# Patient Record
Sex: FEMALE
[De-identification: names, ages, dates, MRNs, and addresses within clinical notes are randomized per-mention and may not be internally consistent; named-entity substitution may affect disease eponyms.]

---

## 2021-07-08 ENCOUNTER — APPOINTMENT (OUTPATIENT)
Dept: DERMATOLOGY | Facility: CLINIC | Age: 36
End: 2021-07-08

## 2022-07-25 PROBLEM — Z00.00 ENCOUNTER FOR PREVENTIVE HEALTH EXAMINATION: Status: ACTIVE | Noted: 2022-07-25

## 2022-07-28 ENCOUNTER — APPOINTMENT (OUTPATIENT)
Dept: ORTHOPEDIC SURGERY | Facility: CLINIC | Age: 37
End: 2022-07-28

## 2022-07-28 DIAGNOSIS — M25.852 OTHER SPECIFIED JOINT DISORDERS, LEFT HIP: ICD-10-CM

## 2022-07-28 PROCEDURE — 73503 X-RAY EXAM HIP UNI 4/> VIEWS: CPT

## 2022-07-28 PROCEDURE — 99204 OFFICE O/P NEW MOD 45 MIN: CPT

## 2022-07-28 RX ORDER — MELOXICAM 15 MG/1
15 TABLET ORAL
Qty: 30 | Refills: 2 | Status: ACTIVE | COMMUNITY
Start: 2022-07-28 | End: 1900-01-01

## 2022-07-29 NOTE — ADDENDUM
[FreeTextEntry1] : Documented by Sulema Novak acting as a scribe for Dr. Jj and Rober Kemp PA-C on 07/28/2022. \par \par All medical record entries made by the Scribe were at my, Dr. Jj, and Rober Kemp's, direction and\par personally dictated by me on 07/28/2022. I have reviewed the chart and agree that the record\par accurately reflects my personal performance of the history, physical exam, procedure and imaging.

## 2022-07-29 NOTE — DISCUSSION/SUMMARY
[de-identified] : MARTINE is a 36 year female here today for left  hip pain that has worsened over the past 6 months. She reports clicking, popping and occasional buckling. She denies numbness and tingling.\par \par We had a thorough discussion regarding the nature of her pain, the pathophysiology, as well as all treatment options. Based on the clinical exam and imaging, the patient has left hip impingement. A prescription of Meloxicam was given to be taken as directed with food to prevent GI upset, if occurs pt to D/C and call us at that time. Patient was given prescription of formal physical therapy that she will perform 2x/wk for 6-8 wks. All questions were answered and the patient verbalized understanding. The patient is in agreement with this treatment plan. Patient will follow up in 6-8 wks for repeat clinical assessment.

## 2022-07-29 NOTE — HISTORY OF PRESENT ILLNESS
[Worsening] : worsening [___ mths] : [unfilled] month(s) ago [Running] : worsened by running [Walking] : worsened by walking [de-identified] : MARTINE is a 36 year female here today for left  hip pain that has worsened over the past 6 months. She reports clicking, popping and occasional buckling. She denies numbness and tingling.\par

## 2022-09-08 ENCOUNTER — APPOINTMENT (OUTPATIENT)
Dept: ORTHOPEDIC SURGERY | Facility: CLINIC | Age: 37
End: 2022-09-08

## 2024-10-18 ENCOUNTER — EMERGENCY (EMERGENCY)
Facility: HOSPITAL | Age: 39
LOS: 1 days | Discharge: HOME | End: 2024-10-18
Attending: EMERGENCY MEDICINE
Payer: SELF-PAY

## 2024-10-18 VITALS
HEART RATE: 102 BPM | DIASTOLIC BLOOD PRESSURE: 91 MMHG | WEIGHT: 219.58 LBS | OXYGEN SATURATION: 97 % | TEMPERATURE: 99 F | HEIGHT: 64 IN | SYSTOLIC BLOOD PRESSURE: 143 MMHG | RESPIRATION RATE: 16 BRPM

## 2024-10-18 PROCEDURE — 70486 CT MAXILLOFACIAL W/O DYE: CPT | Mod: MC

## 2024-10-18 PROCEDURE — 99284 EMERGENCY DEPT VISIT MOD MDM: CPT

## 2024-10-18 PROCEDURE — 72125 CT NECK SPINE W/O DYE: CPT | Mod: 26,MC

## 2024-10-18 PROCEDURE — 70486 CT MAXILLOFACIAL W/O DYE: CPT | Mod: 26,MC

## 2024-10-18 PROCEDURE — 96375 TX/PRO/DX INJ NEW DRUG ADDON: CPT

## 2024-10-18 PROCEDURE — 70450 CT HEAD/BRAIN W/O DYE: CPT | Mod: 26,MC

## 2024-10-18 PROCEDURE — 99284 EMERGENCY DEPT VISIT MOD MDM: CPT | Mod: 25

## 2024-10-18 PROCEDURE — 71101 X-RAY EXAM UNILAT RIBS/CHEST: CPT | Mod: 26

## 2024-10-18 PROCEDURE — 70450 CT HEAD/BRAIN W/O DYE: CPT | Mod: MC

## 2024-10-18 PROCEDURE — 72125 CT NECK SPINE W/O DYE: CPT | Mod: MC

## 2024-10-18 PROCEDURE — 96374 THER/PROPH/DIAG INJ IV PUSH: CPT

## 2024-10-18 PROCEDURE — 71101 X-RAY EXAM UNILAT RIBS/CHEST: CPT

## 2024-10-18 RX ORDER — ACETAMINOPHEN 325 MG
1000 TABLET ORAL ONCE
Refills: 0 | Status: COMPLETED | OUTPATIENT
Start: 2024-10-18 | End: 2024-10-18

## 2024-10-18 RX ORDER — KETOROLAC TROMETHAMINE 10 MG/1
30 TABLET, FILM COATED ORAL ONCE
Refills: 0 | Status: DISCONTINUED | OUTPATIENT
Start: 2024-10-18 | End: 2024-10-18

## 2024-10-18 RX ADMIN — Medication 1000 MILLIGRAM(S): at 16:58

## 2024-10-18 RX ADMIN — KETOROLAC TROMETHAMINE 30 MILLIGRAM(S): 10 TABLET, FILM COATED ORAL at 19:12

## 2024-10-18 RX ADMIN — Medication 1500 MILLIGRAM(S): at 16:24

## 2024-10-18 RX ADMIN — Medication 400 MILLIGRAM(S): at 16:55

## 2024-10-18 RX ADMIN — Medication 102 MILLIGRAM(S): at 16:26

## 2024-10-18 NOTE — ED PROVIDER NOTE - PATIENT PORTAL LINK FT
You can access the FollowMyHealth Patient Portal offered by [facility] by registering at the following website: [email or web address]. By joining [facility]’s FollowMyHealth portal, you will also be able to view your health information using other applications (apps) compatible with our system.

## 2024-10-18 NOTE — ED PROVIDER NOTE - PHYSICAL EXAMINATION
Gen: No acute distress, non toxic  HENT: NCAT, +Swelling to right face, cheek and periorbital area. +TTP R cheek. Mucous membranes moist, Oropharynx without exudates, uvula midline  Eyes: subconjunctival hemorrhage to 6 o'clock position R eye. pink conjunctivae, EOMI, PERRL  CV: RRR, nl s1/s2.  Resp: CTAB, normal rate and effort  GI: Abdomen soft, NT, ND. No rebound, no guarding  : No CVAT  Neuro: A&O x 3, CN II-XII intact, sensorimotor intact without deficits. 5/5 str ext x 4.   MSK: No midline c/t/l spine ttp or step offs. +TTP right paracervical muscles and along R trapezius. +TTP right paraspinal lumbar region and left mid/back, lateral ribs. FWB and ambulatory  Skin: Skin abrasion to left cheek. +Multiple skin abrasions to right cheek and R side face.
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
## 2024-10-18 NOTE — ED PROVIDER NOTE - CARE PROVIDER_API CALL
Dontae Jean  Neurosurgery  [address] [place] [id number]  Phone: [phone]  Fax: [phone]  Follow Up Time:
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
## 2024-10-18 NOTE — ED PROVIDER NOTE - NSFOLLOWUPINSTRUCTIONS_ED_ALL_ED_FT
- Return to the ED for any new or worsening symptoms.   - Apply ice to affected area for 15-20 minutes every few hours for the next few days.  Use as much or as frequently as desired.   - Take medications as directed  - Follow-up with neurosurgeon provided for further evaluation of arachnoid cyst seen on CT head.     Contusion    A contusion is a deep bruise. Contusions are the result of a blunt injury to tissues and muscle fibers under the skin. The skin overlying the contusion may turn blue, purple, or yellow. Symptoms also include pain and swelling in the injured area.    SEEK IMMEDIATE MEDICAL CARE IF YOU HAVE ANY OF THE FOLLOWING SYMPTOMS: severe pain, numbness, tingling, pain, weakness, or skin color/temperature change in any part of your body distal to the injury.